# Patient Record
Sex: MALE | Race: WHITE | Employment: OTHER | URBAN - METROPOLITAN AREA
[De-identification: names, ages, dates, MRNs, and addresses within clinical notes are randomized per-mention and may not be internally consistent; named-entity substitution may affect disease eponyms.]

---

## 2018-06-05 ENCOUNTER — NEW PATIENT (OUTPATIENT)
Dept: URBAN - METROPOLITAN AREA CLINIC 26 | Facility: CLINIC | Age: 68
End: 2018-06-05

## 2018-06-05 VITALS
HEART RATE: 46 BPM | SYSTOLIC BLOOD PRESSURE: 133 MMHG | DIASTOLIC BLOOD PRESSURE: 74 MMHG | BODY MASS INDEX: 31.15 KG/M2 | HEIGHT: 61 IN | WEIGHT: 165 LBS

## 2018-06-05 DIAGNOSIS — H02.836: ICD-10-CM

## 2018-06-05 DIAGNOSIS — H02.833: ICD-10-CM

## 2018-06-05 DIAGNOSIS — H43.391: ICD-10-CM

## 2018-06-05 DIAGNOSIS — H35.373: ICD-10-CM

## 2018-06-05 DIAGNOSIS — H43.812: ICD-10-CM

## 2018-06-05 DIAGNOSIS — E11.9: ICD-10-CM

## 2018-06-05 DIAGNOSIS — H04.123: ICD-10-CM

## 2018-06-05 PROCEDURE — 92250 FUNDUS PHOTOGRAPHY W/I&R: CPT

## 2018-06-05 PROCEDURE — 92004 COMPRE OPH EXAM NEW PT 1/>: CPT

## 2018-06-05 ASSESSMENT — TONOMETRY
OS_IOP_MMHG: 13
OD_IOP_MMHG: 15

## 2018-06-05 ASSESSMENT — VISUAL ACUITY
OD_SC: 20/25
OS_SC: 20/20-1

## 2019-07-16 ENCOUNTER — FOLLOW UP (OUTPATIENT)
Dept: URBAN - METROPOLITAN AREA CLINIC 26 | Facility: CLINIC | Age: 69
End: 2019-07-16

## 2019-07-16 VITALS — HEIGHT: 61 IN | BODY MASS INDEX: 30.58 KG/M2 | WEIGHT: 162 LBS

## 2019-07-16 DIAGNOSIS — H43.812: ICD-10-CM

## 2019-07-16 DIAGNOSIS — H02.833: ICD-10-CM

## 2019-07-16 DIAGNOSIS — H43.391: ICD-10-CM

## 2019-07-16 DIAGNOSIS — H04.123: ICD-10-CM

## 2019-07-16 DIAGNOSIS — H35.373: ICD-10-CM

## 2019-07-16 DIAGNOSIS — E11.9: ICD-10-CM

## 2019-07-16 DIAGNOSIS — H02.836: ICD-10-CM

## 2019-07-16 PROCEDURE — 92134 CPTRZ OPH DX IMG PST SGM RTA: CPT

## 2019-07-16 PROCEDURE — 92250 FUNDUS PHOTOGRAPHY W/I&R: CPT

## 2019-07-16 PROCEDURE — 92014 COMPRE OPH EXAM EST PT 1/>: CPT

## 2019-07-16 ASSESSMENT — TONOMETRY
OD_IOP_MMHG: 16
OS_IOP_MMHG: 12

## 2019-07-16 ASSESSMENT — VISUAL ACUITY
OS_SC: 20/30
OD_SC: 20/30-2

## 2020-07-22 ENCOUNTER — FOLLOW UP (OUTPATIENT)
Dept: URBAN - METROPOLITAN AREA CLINIC 26 | Facility: CLINIC | Age: 70
End: 2020-07-22

## 2020-07-22 VITALS — BODY MASS INDEX: 30.58 KG/M2 | WEIGHT: 162 LBS | HEIGHT: 61 IN

## 2020-07-22 DIAGNOSIS — H33.311: ICD-10-CM

## 2020-07-22 DIAGNOSIS — H35.373: ICD-10-CM

## 2020-07-22 DIAGNOSIS — H43.812: ICD-10-CM

## 2020-07-22 DIAGNOSIS — H43.391: ICD-10-CM

## 2020-07-22 DIAGNOSIS — E11.9: ICD-10-CM

## 2020-07-22 PROCEDURE — 92250 FUNDUS PHOTOGRAPHY W/I&R: CPT

## 2020-07-22 PROCEDURE — 92014 COMPRE OPH EXAM EST PT 1/>: CPT

## 2020-07-22 PROCEDURE — 92134 CPTRZ OPH DX IMG PST SGM RTA: CPT

## 2020-07-22 ASSESSMENT — VISUAL ACUITY
OS_SC: 20/20-1
OD_SC: 20/25-2

## 2020-07-22 ASSESSMENT — TONOMETRY
OS_IOP_MMHG: 13
OD_IOP_MMHG: 15

## 2021-08-03 ENCOUNTER — FOLLOW UP (OUTPATIENT)
Dept: URBAN - METROPOLITAN AREA CLINIC 26 | Facility: CLINIC | Age: 71
End: 2021-08-03

## 2021-08-03 DIAGNOSIS — H33.311: ICD-10-CM

## 2021-08-03 DIAGNOSIS — H43.391: ICD-10-CM

## 2021-08-03 DIAGNOSIS — H35.373: ICD-10-CM

## 2021-08-03 DIAGNOSIS — E11.9: ICD-10-CM

## 2021-08-03 DIAGNOSIS — H43.812: ICD-10-CM

## 2021-08-03 PROCEDURE — 92250 FUNDUS PHOTOGRAPHY W/I&R: CPT

## 2021-08-03 PROCEDURE — 92014 COMPRE OPH EXAM EST PT 1/>: CPT

## 2021-08-03 PROCEDURE — 92134 CPTRZ OPH DX IMG PST SGM RTA: CPT

## 2021-08-03 ASSESSMENT — VISUAL ACUITY
OS_SC: 20/30+1
OD_SC: 20/25-2

## 2021-08-03 ASSESSMENT — TONOMETRY
OD_IOP_MMHG: 14
OS_IOP_MMHG: 8

## 2022-07-09 ENCOUNTER — TELEPHONE ENCOUNTER (OUTPATIENT)
Dept: URBAN - METROPOLITAN AREA CLINIC 121 | Facility: CLINIC | Age: 72
End: 2022-07-09

## 2022-07-10 ENCOUNTER — TELEPHONE ENCOUNTER (OUTPATIENT)
Dept: URBAN - METROPOLITAN AREA CLINIC 121 | Facility: CLINIC | Age: 72
End: 2022-07-10

## 2022-07-30 ENCOUNTER — TELEPHONE ENCOUNTER (OUTPATIENT)
Age: 72
End: 2022-07-30

## 2022-07-31 ENCOUNTER — TELEPHONE ENCOUNTER (OUTPATIENT)
Age: 72
End: 2022-07-31

## 2022-08-09 ENCOUNTER — FOLLOW UP (OUTPATIENT)
Dept: URBAN - METROPOLITAN AREA CLINIC 26 | Facility: CLINIC | Age: 72
End: 2022-08-09

## 2022-08-09 VITALS — HEIGHT: 61 IN | BODY MASS INDEX: 30.58 KG/M2 | WEIGHT: 162 LBS

## 2022-08-09 DIAGNOSIS — E11.9: ICD-10-CM

## 2022-08-09 DIAGNOSIS — H43.391: ICD-10-CM

## 2022-08-09 DIAGNOSIS — H43.812: ICD-10-CM

## 2022-08-09 DIAGNOSIS — H04.123: ICD-10-CM

## 2022-08-09 DIAGNOSIS — H35.373: ICD-10-CM

## 2022-08-09 DIAGNOSIS — H33.311: ICD-10-CM

## 2022-08-09 PROCEDURE — 92014 COMPRE OPH EXAM EST PT 1/>: CPT

## 2022-08-09 PROCEDURE — 92134 CPTRZ OPH DX IMG PST SGM RTA: CPT

## 2022-08-09 PROCEDURE — 92250 FUNDUS PHOTOGRAPHY W/I&R: CPT

## 2022-08-09 ASSESSMENT — VISUAL ACUITY
OS_SC: 20/30-2
OD_SC: 20/30-2

## 2022-08-09 ASSESSMENT — TONOMETRY
OD_IOP_MMHG: 12
OS_IOP_MMHG: 13

## 2023-08-09 ENCOUNTER — FOLLOW UP (OUTPATIENT)
Dept: URBAN - METROPOLITAN AREA CLINIC 26 | Facility: CLINIC | Age: 73
End: 2023-08-09

## 2023-08-09 VITALS — BODY MASS INDEX: 30.58 KG/M2 | WEIGHT: 162 LBS | HEIGHT: 61 IN

## 2023-08-09 DIAGNOSIS — E11.9: ICD-10-CM

## 2023-08-09 DIAGNOSIS — H43.391: ICD-10-CM

## 2023-08-09 DIAGNOSIS — H04.123: ICD-10-CM

## 2023-08-09 DIAGNOSIS — H33.311: ICD-10-CM

## 2023-08-09 DIAGNOSIS — H35.373: ICD-10-CM

## 2023-08-09 DIAGNOSIS — H43.812: ICD-10-CM

## 2023-08-09 PROCEDURE — 92014 COMPRE OPH EXAM EST PT 1/>: CPT

## 2023-08-09 PROCEDURE — 92134 CPTRZ OPH DX IMG PST SGM RTA: CPT

## 2023-08-09 PROCEDURE — 92250 FUNDUS PHOTOGRAPHY W/I&R: CPT

## 2023-08-09 RX ORDER — ERYTHROMYCIN 5 MG/G: OINTMENT OPHTHALMIC EVERY EVENING

## 2023-08-09 ASSESSMENT — TONOMETRY
OD_IOP_MMHG: 20
OS_IOP_MMHG: 15

## 2023-08-09 ASSESSMENT — VISUAL ACUITY
OS_SC: 20/25-1
OD_SC: 20/40-1
OD_PH: 20/25-1

## 2023-11-26 PROBLEM — 428283002: Status: ACTIVE | Noted: 2023-11-26

## 2023-11-26 PROBLEM — 197321007: Status: ACTIVE | Noted: 2023-11-26

## 2023-11-27 ENCOUNTER — LAB OUTSIDE AN ENCOUNTER (OUTPATIENT)
Dept: URBAN - METROPOLITAN AREA CLINIC 60 | Facility: CLINIC | Age: 73
End: 2023-11-27

## 2023-11-27 ENCOUNTER — OFFICE VISIT (OUTPATIENT)
Dept: URBAN - METROPOLITAN AREA CLINIC 60 | Facility: CLINIC | Age: 73
End: 2023-11-27
Payer: MEDICARE

## 2023-11-27 VITALS
HEART RATE: 106 BPM | HEIGHT: 60 IN | DIASTOLIC BLOOD PRESSURE: 82 MMHG | BODY MASS INDEX: 30.63 KG/M2 | OXYGEN SATURATION: 93 % | SYSTOLIC BLOOD PRESSURE: 140 MMHG | WEIGHT: 156 LBS | TEMPERATURE: 97.7 F

## 2023-11-27 DIAGNOSIS — K21.9 CHRONIC GERD: ICD-10-CM

## 2023-11-27 DIAGNOSIS — K76.0 FATTY LIVER: ICD-10-CM

## 2023-11-27 DIAGNOSIS — Z86.010 PERSONAL HISTORY OF COLONIC POLYPS: ICD-10-CM

## 2023-11-27 PROCEDURE — 99204 OFFICE O/P NEW MOD 45 MIN: CPT | Performed by: PHYSICIAN ASSISTANT

## 2023-11-27 RX ORDER — SITAGLIPTIN 100 MG/1
TABLET, FILM COATED ORAL
Qty: 30 TABLET | Status: ACTIVE | COMMUNITY

## 2023-11-27 RX ORDER — ICOSAPENT ETHYL 1 G/1
CAPSULE ORAL
Qty: 60 CAPSULE | Status: ACTIVE | COMMUNITY

## 2023-11-27 RX ORDER — METOPROLOL SUCCINATE 25 MG/1
TABLET, EXTENDED RELEASE ORAL
Qty: 45 TABLET | Status: ACTIVE | COMMUNITY

## 2023-11-27 RX ORDER — ALBUTEROL SULFATE 108 UG/1
INHALE 1 TO 2 PUFFS EVERY 4 TO 6 HOURS AS NEEDED FOR WHEEZE FOR UP TO 30 DAYS INHALANT RESPIRATORY (INHALATION)
Qty: 6.7 GRAM | Refills: 0 | Status: ACTIVE | COMMUNITY

## 2023-11-27 RX ORDER — ATORVASTATIN CALCIUM 80 MG/1
TABLET, FILM COATED ORAL
Qty: 90 TABLET | Status: ACTIVE | COMMUNITY

## 2023-11-27 RX ORDER — AMLODIPINE BESYLATE 10 MG/1
TABLET ORAL
Qty: 90 TABLET | Status: ACTIVE | COMMUNITY

## 2023-11-27 RX ORDER — OMEPRAZOLE 40 MG/1
CAPSULE, DELAYED RELEASE ORAL
Qty: 30 CAPSULE | Status: ACTIVE | COMMUNITY

## 2023-11-27 RX ORDER — EMPAGLIFLOZIN 25 MG/1
TABLET, FILM COATED ORAL
Qty: 30 TABLET | Status: ACTIVE | COMMUNITY

## 2023-11-27 RX ORDER — RANOLAZINE 500 MG/1
TABLET, FILM COATED, EXTENDED RELEASE ORAL
Qty: 180 TABLET | Status: ACTIVE | COMMUNITY

## 2023-11-27 RX ORDER — NIRMATRELVIR AND RITONAVIR 300-100 MG
KIT ORAL
Qty: 30 TABLET | Status: ACTIVE | COMMUNITY

## 2023-11-27 RX ORDER — DIAZEPAM 5 MG/1
TABLET ORAL
Qty: 2 TABLET | Status: ACTIVE | COMMUNITY

## 2023-11-27 RX ORDER — TAMSULOSIN HYDROCHLORIDE 0.4 MG/1
CAPSULE ORAL
Qty: 90 CAPSULE | Status: ACTIVE | COMMUNITY

## 2023-11-27 RX ORDER — CLOPIDOGREL BISULFATE 75 MG/1
TABLET, FILM COATED ORAL
Qty: 90 TABLET | Status: ACTIVE | COMMUNITY

## 2023-11-27 RX ORDER — RIVAROXABAN 20 MG/1
TABLET, FILM COATED ORAL
Qty: 30 TABLET | Status: ACTIVE | COMMUNITY

## 2023-11-27 RX ORDER — ERYTHROMYCIN 5 MG/G
OINTMENT OPHTHALMIC
Qty: 3.5 GRAM | Status: ACTIVE | COMMUNITY

## 2023-11-27 RX ORDER — ALPRAZOLAM 0.5 MG/1
TABLET ORAL
Qty: 60 TABLET | Status: ACTIVE | COMMUNITY

## 2023-11-27 RX ORDER — HYDROCODONE BITARTRATE AND ACETAMINOPHEN 10; 325 MG/1; MG/1
TABLET ORAL
Qty: 60 TABLET | Status: ACTIVE | COMMUNITY

## 2023-11-27 NOTE — HPI-TODAY'S VISIT:
Jim is a pleasant 73-year-old male who presents today for evaluation of a surveillance colonoscopy  Labs dated 10/4/23 showed a normal hemoglobin.  Normal platelets.  Normal creatinine.  Normal GFR.  Normal LFTs.  Colonoscopy in June 2014 showed two 5 to 9 mm hyperplastic polyps removed from the sigmoid colon.  Diverticulosis in the sigmoid colon.  Internal hemorrhoids.  Colonoscopy May 2019 with Dr. Yates showed multiple diverticula in the sigmoid colon with no evidence of inflammation or bleeding.  Good prep.  Placed into a 5-year recall  Notes 1-2 daily bowel movements without constipation or diarrhea. Doing well when he remembers to take Metamucil. Chronic reflux and chronic PPI therapy for many years. EGD naive. Denies nausea, vomiting, dysphagia, odynophagia, hematemesis, coffee ground emesis, abdominal pain, change in bowel habits, abnormal weight loss, BRBPR or melena. Denies family history of colon cancer or colon polyps. No other GI complaints at this time

## 2023-12-04 ENCOUNTER — LAB OUTSIDE AN ENCOUNTER (OUTPATIENT)
Dept: URBAN - METROPOLITAN AREA CLINIC 60 | Facility: CLINIC | Age: 73
End: 2023-12-04

## 2023-12-11 ENCOUNTER — TELEPHONE ENCOUNTER (OUTPATIENT)
Dept: URBAN - METROPOLITAN AREA CLINIC 63 | Facility: CLINIC | Age: 73
End: 2023-12-11

## 2023-12-21 ENCOUNTER — TELEPHONE ENCOUNTER (OUTPATIENT)
Dept: URBAN - METROPOLITAN AREA CLINIC 63 | Facility: CLINIC | Age: 73
End: 2023-12-21

## 2023-12-21 ENCOUNTER — LAB OUTSIDE AN ENCOUNTER (OUTPATIENT)
Dept: URBAN - METROPOLITAN AREA CLINIC 63 | Facility: CLINIC | Age: 73
End: 2023-12-21

## 2023-12-21 ENCOUNTER — OFFICE VISIT (OUTPATIENT)
Dept: URBAN - METROPOLITAN AREA SURGERY CENTER 4 | Facility: SURGERY CENTER | Age: 73
End: 2023-12-21

## 2023-12-21 PROBLEM — 19943007: Status: ACTIVE | Noted: 2023-12-21

## 2024-02-05 ENCOUNTER — OV EP (OUTPATIENT)
Dept: URBAN - METROPOLITAN AREA CLINIC 60 | Facility: CLINIC | Age: 74
End: 2024-02-05

## 2024-04-08 ENCOUNTER — LAB (OUTPATIENT)
Dept: URBAN - METROPOLITAN AREA CLINIC 60 | Facility: CLINIC | Age: 74
End: 2024-04-08

## 2024-04-08 ENCOUNTER — OV EP (OUTPATIENT)
Dept: URBAN - METROPOLITAN AREA CLINIC 60 | Facility: CLINIC | Age: 74
End: 2024-04-08
Payer: MEDICARE

## 2024-04-08 VITALS
SYSTOLIC BLOOD PRESSURE: 146 MMHG | OXYGEN SATURATION: 96 % | HEIGHT: 60 IN | WEIGHT: 153.6 LBS | HEART RATE: 115 BPM | TEMPERATURE: 97.4 F | DIASTOLIC BLOOD PRESSURE: 80 MMHG | BODY MASS INDEX: 30.15 KG/M2

## 2024-04-08 DIAGNOSIS — K76.0 FATTY LIVER: ICD-10-CM

## 2024-04-08 DIAGNOSIS — K74.69 OTHER CIRRHOSIS OF LIVER: ICD-10-CM

## 2024-04-08 DIAGNOSIS — Z86.010 PERSONAL HISTORY OF COLONIC POLYPS: ICD-10-CM

## 2024-04-08 DIAGNOSIS — K21.9 CHRONIC GERD: ICD-10-CM

## 2024-04-08 PROCEDURE — 99214 OFFICE O/P EST MOD 30 MIN: CPT | Performed by: PHYSICIAN ASSISTANT

## 2024-04-08 RX ORDER — CLOPIDOGREL BISULFATE 75 MG/1
TABLET, FILM COATED ORAL
Qty: 90 TABLET | Status: ACTIVE | COMMUNITY

## 2024-04-08 RX ORDER — RANOLAZINE 500 MG/1
TABLET, FILM COATED, EXTENDED RELEASE ORAL
Qty: 180 TABLET | Status: ACTIVE | COMMUNITY

## 2024-04-08 RX ORDER — TAMSULOSIN HYDROCHLORIDE 0.4 MG/1
CAPSULE ORAL
Qty: 90 CAPSULE | Status: ACTIVE | COMMUNITY

## 2024-04-08 RX ORDER — RIVAROXABAN 20 MG/1
TABLET, FILM COATED ORAL
Qty: 30 TABLET | Status: ACTIVE | COMMUNITY

## 2024-04-08 RX ORDER — POLYETHYLENE GLYCOL 3350, SODIUM CHLORIDE, SODIUM BICARBONATE, POTASSIUM CHLORIDE 420; 11.2; 5.72; 1.48 G/4L; G/4L; G/4L; G/4L
AS DIRECTED POWDER, FOR SOLUTION ORAL ONCE
Qty: 4000 | Refills: 0 | OUTPATIENT
Start: 2024-04-08 | End: 2024-04-09

## 2024-04-08 RX ORDER — AMLODIPINE BESYLATE 10 MG/1
TABLET ORAL
Qty: 90 TABLET | Status: ACTIVE | COMMUNITY

## 2024-04-08 RX ORDER — DIAZEPAM 5 MG/1
TABLET ORAL
Qty: 2 TABLET | Status: ACTIVE | COMMUNITY

## 2024-04-08 RX ORDER — METOPROLOL SUCCINATE 25 MG/1
TABLET, EXTENDED RELEASE ORAL
Qty: 45 TABLET | Status: ACTIVE | COMMUNITY

## 2024-04-08 RX ORDER — ALBUTEROL SULFATE 108 UG/1
INHALE 1 TO 2 PUFFS EVERY 4 TO 6 HOURS AS NEEDED FOR WHEEZE FOR UP TO 30 DAYS INHALANT RESPIRATORY (INHALATION)
Qty: 6.7 GRAM | Refills: 0 | Status: ACTIVE | COMMUNITY

## 2024-04-08 RX ORDER — ONDANSETRON HYDROCHLORIDE 4 MG/1
1 TABLET TABLET, FILM COATED ORAL
Qty: 2 | Refills: 0 | OUTPATIENT
Start: 2024-04-08

## 2024-04-08 RX ORDER — ATORVASTATIN CALCIUM 80 MG/1
TABLET, FILM COATED ORAL
Qty: 90 TABLET | Status: ACTIVE | COMMUNITY

## 2024-04-08 RX ORDER — HYDROCODONE BITARTRATE AND ACETAMINOPHEN 10; 325 MG/1; MG/1
TABLET ORAL
Qty: 60 TABLET | Status: ACTIVE | COMMUNITY

## 2024-04-08 RX ORDER — ERYTHROMYCIN 5 MG/G
OINTMENT OPHTHALMIC
Qty: 3.5 GRAM | Status: ACTIVE | COMMUNITY

## 2024-04-08 RX ORDER — OMEPRAZOLE 40 MG/1
CAPSULE, DELAYED RELEASE ORAL
Qty: 30 CAPSULE | Status: ACTIVE | COMMUNITY

## 2024-04-08 RX ORDER — NIRMATRELVIR AND RITONAVIR 300-100 MG
KIT ORAL
Qty: 30 TABLET | Status: ACTIVE | COMMUNITY

## 2024-04-08 RX ORDER — ALPRAZOLAM 0.5 MG/1
TABLET ORAL
Qty: 60 TABLET | Status: ACTIVE | COMMUNITY

## 2024-04-08 RX ORDER — SITAGLIPTIN 100 MG/1
TABLET, FILM COATED ORAL
Qty: 30 TABLET | Status: ACTIVE | COMMUNITY

## 2024-04-08 RX ORDER — ICOSAPENT ETHYL 1 G/1
CAPSULE ORAL
Qty: 60 CAPSULE | Status: ACTIVE | COMMUNITY

## 2024-04-08 RX ORDER — EMPAGLIFLOZIN 25 MG/1
TABLET, FILM COATED ORAL
Qty: 30 TABLET | Status: ACTIVE | COMMUNITY

## 2024-04-08 NOTE — HPI-TODAY'S VISIT:
Jim is a pleasant 73-year-old male who presents today for a follow up  Labs dated 11/28/2023 showed a normal hemoglobin.  Normal platelets.  Creatinine 0.56.  .  ALT 64 but otherwise normal LFTs.  TSH normal.  CRP normal.  Vitamin D normal.  Ultrasound dated 12/19/2023 showed cirrhotic liver morphology.  No sonographic evidence of HCC  FibroScan dated 12/19/2023 showed S3 and F4  Labs dated 10/4/23 showed a normal hemoglobin.  Normal platelets.  Normal creatinine.  Normal GFR.  Normal LFTs.  Colonoscopy in June 2014 showed two 5 to 9 mm hyperplastic polyps removed from the sigmoid colon.  Diverticulosis in the sigmoid colon.  Internal hemorrhoids.  Colonoscopy May 2019 with Dr. Yates showed multiple diverticula in the sigmoid colon with no evidence of inflammation or bleeding.  Good prep.  Placed into a 5-year recall  Denies history of heavy EtOH use. Notes 1-2 daily bowel movements without constipation or diarrhea. Doing well with Metamucil. Chronic reflux and chronic PPI therapy for many years. EGD naive. EGD ordered last visit was canceled as patient was ill. Denies nausea, vomiting, dysphagia, odynophagia, hematemesis, coffee ground emesis, abdominal pain, change in bowel habits, abnormal weight loss, BRBPR or melena. Denies family history of colon cancer or colon polyps. No other GI complaints at this time

## 2024-04-15 ENCOUNTER — LAB (OUTPATIENT)
Dept: URBAN - METROPOLITAN AREA CLINIC 60 | Facility: CLINIC | Age: 74
End: 2024-04-15

## 2024-05-02 ENCOUNTER — LAB OUTSIDE AN ENCOUNTER (OUTPATIENT)
Dept: URBAN - METROPOLITAN AREA CLINIC 63 | Facility: CLINIC | Age: 74
End: 2024-05-02

## 2024-05-14 ENCOUNTER — LAB OUTSIDE AN ENCOUNTER (OUTPATIENT)
Dept: URBAN - METROPOLITAN AREA CLINIC 63 | Facility: CLINIC | Age: 74
End: 2024-05-14

## 2024-05-17 ENCOUNTER — TELEPHONE ENCOUNTER (OUTPATIENT)
Dept: URBAN - METROPOLITAN AREA CLINIC 60 | Facility: CLINIC | Age: 74
End: 2024-05-17

## 2024-06-07 ENCOUNTER — TELEPHONE ENCOUNTER (OUTPATIENT)
Dept: URBAN - METROPOLITAN AREA CLINIC 63 | Facility: CLINIC | Age: 74
End: 2024-06-07

## 2024-06-07 PROBLEM — 14783006: Status: ACTIVE | Noted: 2024-06-07

## 2024-06-10 ENCOUNTER — OFFICE VISIT (OUTPATIENT)
Dept: URBAN - METROPOLITAN AREA MEDICAL CENTER 3 | Facility: MEDICAL CENTER | Age: 74
End: 2024-06-10

## 2024-06-10 RX ORDER — DIAZEPAM 5 MG/1
TABLET ORAL
Qty: 2 TABLET | Status: ACTIVE | COMMUNITY

## 2024-06-10 RX ORDER — SITAGLIPTIN 100 MG/1
TABLET, FILM COATED ORAL
Qty: 30 TABLET | Status: ACTIVE | COMMUNITY

## 2024-06-10 RX ORDER — ONDANSETRON HYDROCHLORIDE 4 MG/1
1 TABLET TABLET, FILM COATED ORAL
Qty: 2 | Refills: 0 | Status: ACTIVE | COMMUNITY
Start: 2024-04-08

## 2024-06-10 RX ORDER — ICOSAPENT ETHYL 1 G/1
CAPSULE ORAL
Qty: 60 CAPSULE | Status: ACTIVE | COMMUNITY

## 2024-06-10 RX ORDER — RIVAROXABAN 20 MG/1
TABLET, FILM COATED ORAL
Qty: 30 TABLET | Status: ACTIVE | COMMUNITY

## 2024-06-10 RX ORDER — TAMSULOSIN HYDROCHLORIDE 0.4 MG/1
CAPSULE ORAL
Qty: 90 CAPSULE | Status: ACTIVE | COMMUNITY

## 2024-06-10 RX ORDER — ALPRAZOLAM 0.5 MG/1
TABLET ORAL
Qty: 60 TABLET | Status: ACTIVE | COMMUNITY

## 2024-06-10 RX ORDER — METOPROLOL SUCCINATE 25 MG/1
TABLET, EXTENDED RELEASE ORAL
Qty: 45 TABLET | Status: ACTIVE | COMMUNITY

## 2024-06-10 RX ORDER — EMPAGLIFLOZIN 25 MG/1
TABLET, FILM COATED ORAL
Qty: 30 TABLET | Status: ACTIVE | COMMUNITY

## 2024-06-10 RX ORDER — NIRMATRELVIR AND RITONAVIR 300-100 MG
KIT ORAL
Qty: 30 TABLET | Status: ACTIVE | COMMUNITY

## 2024-06-10 RX ORDER — OMEPRAZOLE 40 MG/1
CAPSULE, DELAYED RELEASE ORAL
Qty: 30 CAPSULE | Status: ACTIVE | COMMUNITY

## 2024-06-10 RX ORDER — ALBUTEROL SULFATE 108 UG/1
INHALE 1 TO 2 PUFFS EVERY 4 TO 6 HOURS AS NEEDED FOR WHEEZE FOR UP TO 30 DAYS INHALANT RESPIRATORY (INHALATION)
Qty: 6.7 GRAM | Refills: 0 | Status: ACTIVE | COMMUNITY

## 2024-06-10 RX ORDER — CLOPIDOGREL BISULFATE 75 MG/1
TABLET, FILM COATED ORAL
Qty: 90 TABLET | Status: ACTIVE | COMMUNITY

## 2024-06-10 RX ORDER — HYDROCODONE BITARTRATE AND ACETAMINOPHEN 10; 325 MG/1; MG/1
TABLET ORAL
Qty: 60 TABLET | Status: ACTIVE | COMMUNITY

## 2024-06-10 RX ORDER — AMLODIPINE BESYLATE 10 MG/1
TABLET ORAL
Qty: 90 TABLET | Status: ACTIVE | COMMUNITY

## 2024-06-10 RX ORDER — RANOLAZINE 500 MG/1
TABLET, FILM COATED, EXTENDED RELEASE ORAL
Qty: 180 TABLET | Status: ACTIVE | COMMUNITY

## 2024-06-10 RX ORDER — ERYTHROMYCIN 5 MG/G
OINTMENT OPHTHALMIC
Qty: 3.5 GRAM | Status: ACTIVE | COMMUNITY

## 2024-06-10 RX ORDER — ATORVASTATIN CALCIUM 80 MG/1
TABLET, FILM COATED ORAL
Qty: 90 TABLET | Status: ACTIVE | COMMUNITY

## 2024-06-24 ENCOUNTER — DASHBOARD ENCOUNTERS (OUTPATIENT)
Age: 74
End: 2024-06-24

## 2024-06-24 ENCOUNTER — OFFICE VISIT (OUTPATIENT)
Dept: URBAN - METROPOLITAN AREA CLINIC 60 | Facility: CLINIC | Age: 74
End: 2024-06-24

## 2024-06-24 ENCOUNTER — LAB OUTSIDE AN ENCOUNTER (OUTPATIENT)
Dept: URBAN - METROPOLITAN AREA CLINIC 60 | Facility: CLINIC | Age: 74
End: 2024-06-24

## 2024-06-24 VITALS
SYSTOLIC BLOOD PRESSURE: 138 MMHG | DIASTOLIC BLOOD PRESSURE: 86 MMHG | OXYGEN SATURATION: 93 % | HEART RATE: 110 BPM | HEIGHT: 60 IN | TEMPERATURE: 97.4 F

## 2024-06-24 PROBLEM — 420054005: Status: ACTIVE | Noted: 2024-06-24

## 2024-06-24 RX ORDER — CLOPIDOGREL BISULFATE 75 MG/1
TABLET, FILM COATED ORAL
Qty: 90 TABLET | Status: ACTIVE | COMMUNITY

## 2024-06-24 RX ORDER — ALBUTEROL SULFATE 108 UG/1
INHALE 1 TO 2 PUFFS EVERY 4 TO 6 HOURS AS NEEDED FOR WHEEZE FOR UP TO 30 DAYS INHALANT RESPIRATORY (INHALATION)
Qty: 6.7 GRAM | Refills: 0 | Status: ACTIVE | COMMUNITY

## 2024-06-24 RX ORDER — ERYTHROMYCIN 5 MG/G
OINTMENT OPHTHALMIC
Qty: 3.5 GRAM | Status: ACTIVE | COMMUNITY

## 2024-06-24 RX ORDER — RIVAROXABAN 20 MG/1
TABLET, FILM COATED ORAL
Qty: 30 TABLET | Status: ACTIVE | COMMUNITY

## 2024-06-24 RX ORDER — METOPROLOL SUCCINATE 25 MG/1
TABLET, EXTENDED RELEASE ORAL
Qty: 45 TABLET | Status: ACTIVE | COMMUNITY

## 2024-06-24 RX ORDER — ATORVASTATIN CALCIUM 80 MG/1
TABLET, FILM COATED ORAL
Qty: 90 TABLET | Status: ACTIVE | COMMUNITY

## 2024-06-24 RX ORDER — TAMSULOSIN HYDROCHLORIDE 0.4 MG/1
CAPSULE ORAL
Qty: 90 CAPSULE | Status: ACTIVE | COMMUNITY

## 2024-06-24 RX ORDER — HYDROCODONE BITARTRATE AND ACETAMINOPHEN 10; 325 MG/1; MG/1
TABLET ORAL
Qty: 60 TABLET | Status: ACTIVE | COMMUNITY

## 2024-06-24 RX ORDER — ICOSAPENT ETHYL 1 G/1
CAPSULE ORAL
Qty: 60 CAPSULE | Status: ACTIVE | COMMUNITY

## 2024-06-24 RX ORDER — NIRMATRELVIR AND RITONAVIR 300-100 MG
KIT ORAL
Qty: 30 TABLET | Status: ACTIVE | COMMUNITY

## 2024-06-24 RX ORDER — RIFAXIMIN 550 MG/1
1 TABLET TABLET ORAL TWICE A DAY
Qty: 180 TABLET | Refills: 3 | OUTPATIENT
Start: 2024-06-24 | End: 2025-06-19

## 2024-06-24 RX ORDER — RANOLAZINE 500 MG/1
TABLET, FILM COATED, EXTENDED RELEASE ORAL
Qty: 180 TABLET | Status: ACTIVE | COMMUNITY

## 2024-06-24 RX ORDER — DIAZEPAM 5 MG/1
TABLET ORAL
Qty: 2 TABLET | Status: ACTIVE | COMMUNITY

## 2024-06-24 RX ORDER — AMLODIPINE BESYLATE 10 MG/1
TABLET ORAL
Qty: 90 TABLET | Status: ACTIVE | COMMUNITY

## 2024-06-24 RX ORDER — ALPRAZOLAM 0.5 MG/1
TABLET ORAL
Qty: 60 TABLET | Status: ACTIVE | COMMUNITY

## 2024-06-24 RX ORDER — OMEPRAZOLE 40 MG/1
CAPSULE, DELAYED RELEASE ORAL
Qty: 30 CAPSULE | Status: ACTIVE | COMMUNITY

## 2024-06-24 RX ORDER — EMPAGLIFLOZIN 25 MG/1
TABLET, FILM COATED ORAL
Qty: 30 TABLET | Status: ACTIVE | COMMUNITY

## 2024-06-24 RX ORDER — ONDANSETRON HYDROCHLORIDE 4 MG/1
1 TABLET TABLET, FILM COATED ORAL
Qty: 2 | Refills: 0 | Status: ACTIVE | COMMUNITY
Start: 2024-04-08

## 2024-06-24 RX ORDER — SITAGLIPTIN 100 MG/1
TABLET, FILM COATED ORAL
Qty: 30 TABLET | Status: ACTIVE | COMMUNITY

## 2024-06-24 NOTE — HPI-ZZZTODAY'S VISIT
Jim is a very pleasant 74-year-old male who presents for follow-up.  He is previously a patient of Dr. Sarah.  Last seen by Andrez Krishna PA-C 4/8/2024.  Past medical history significant for GI bleed, ARTURO, DM, BPH, CAD, GERD, hypercholesterolemia, cirrhosis, hypertension, atrial flutter, fatty liver.  Past surgical history significant for orthopedic procedure, cardiac stent, appendectomy, cataract procedure, inguinal hernia repair, CABG, carotid endarterectomy with bovine patch.  Last colonoscopy and endoscopy 6/10/2024.  Family history noncontributory.  Marcelo presents for follow up. He is grossly asymptomatic from a GI persepective. He does admit that he is having inreased confusion such as losing important items and having trouble with conversation. He denies dysphagia, dyspepsia, pyrosis, abdominal pain, change in bowel habits, unintentional weight loss, melena, or hematochezia.  Patient denies jaundice, pruritus, change in mentation, abdominal distention, recent weight gain, or sleep disturbances.

## 2024-06-24 NOTE — HPI-PREVIOUS IMAGING
5/14/2024 MR abdomen liver with and without contrast consistent with hepatic steatosis and cirrhotic liver morphology with evidence of early mild portal hypertension and no ascites.  No evidence of HCC, small pancreatic sidebranch IPMN largest measuring 0.7 cm. 5/2/2024 duplex ultrasound of portal veins consistent with morphology changes of cirrhosis without evidence of portal hypertension or thrombosis.

## 2024-06-24 NOTE — HPI-PREVIOUS PROCEDURES
Previous procedures: 6/10/2024 no esophageal varices present in distal esophagus Z-line normal Minimal gastritis in antrum and body No gastric varices present Advancement into the duodenal bulb first and second portion duodenum normal GLORIA within normal limits Ileocecal valve within normal limits Preparation was adequate 5 mm sessile polyp in mid transverse colon 2 diminutive polyps in mid sigmoid colon Mild internal hemorrhoids Pathology consistent with: Gastric antrum biopsy consistent with gastric mucosa with reactive gastropathy and dilated fundic glands negative for H. pylori Distal esophageal biopsy consistent with squamous mucosa with features of reflux esophagitis Mid transverse colon biopsy consistent with tubular adenoma Mid sigmoid polyp consistent with tubular adenoma with fragments of benign colonic mucosa with superficial hyperplastic changes

## 2024-06-24 NOTE — HPI-PREVIOUS LABS
5/31/2024 CBC significant for MPV 8.5, PT 25.9, INR 2.47, AFP within normal limits at 4.11, CMP significant for glucose 121, creatinine 0.55, anion gap of 5, total bilirubin 1.7 (sodium 130, INR 2.47, bilirubin 1.7, creatinine 0.55; MELD 23), hep B surface antibody negative, hep B core antibody negative, hep B surface antigen negative hep a negative, hep C-

## 2024-07-05 ENCOUNTER — LAB OUTSIDE AN ENCOUNTER (OUTPATIENT)
Dept: URBAN - METROPOLITAN AREA CLINIC 63 | Facility: CLINIC | Age: 74
End: 2024-07-05

## 2024-07-09 ENCOUNTER — TELEPHONE ENCOUNTER (OUTPATIENT)
Dept: URBAN - METROPOLITAN AREA CLINIC 60 | Facility: CLINIC | Age: 74
End: 2024-07-09

## 2024-08-13 ENCOUNTER — COMPREHENSIVE EXAM (OUTPATIENT)
Dept: URBAN - METROPOLITAN AREA CLINIC 26 | Facility: CLINIC | Age: 74
End: 2024-08-13

## 2024-08-13 VITALS — BODY MASS INDEX: 28.51 KG/M2 | HEIGHT: 61 IN | WEIGHT: 151 LBS

## 2024-08-13 DIAGNOSIS — H43.812: ICD-10-CM

## 2024-08-13 DIAGNOSIS — H01.026: ICD-10-CM

## 2024-08-13 DIAGNOSIS — H01.023: ICD-10-CM

## 2024-08-13 DIAGNOSIS — H43.391: ICD-10-CM

## 2024-08-13 DIAGNOSIS — H02.836: ICD-10-CM

## 2024-08-13 DIAGNOSIS — H04.123: ICD-10-CM

## 2024-08-13 DIAGNOSIS — H35.373: ICD-10-CM

## 2024-08-13 DIAGNOSIS — H33.311: ICD-10-CM

## 2024-08-13 DIAGNOSIS — H26.493: ICD-10-CM

## 2024-08-13 DIAGNOSIS — E11.9: ICD-10-CM

## 2024-08-13 DIAGNOSIS — H02.833: ICD-10-CM

## 2024-08-13 PROCEDURE — 92134 CPTRZ OPH DX IMG PST SGM RTA: CPT

## 2024-08-13 PROCEDURE — 92014 COMPRE OPH EXAM EST PT 1/>: CPT

## 2024-08-13 PROCEDURE — 92250 FUNDUS PHOTOGRAPHY W/I&R: CPT | Mod: 59

## 2024-08-13 ASSESSMENT — TONOMETRY
OS_IOP_MMHG: 11
OD_IOP_MMHG: 10

## 2024-08-13 ASSESSMENT — VISUAL ACUITY
OS_SC: 20/30
OD_PH: 20/20-2
OD_SC: 20/40-2

## 2024-12-24 ENCOUNTER — LAB OUTSIDE AN ENCOUNTER (OUTPATIENT)
Dept: URBAN - METROPOLITAN AREA CLINIC 60 | Facility: CLINIC | Age: 74
End: 2024-12-24

## 2025-08-13 ENCOUNTER — COMPREHENSIVE EXAM (OUTPATIENT)
Age: 75
End: 2025-08-13

## 2025-08-13 VITALS — HEIGHT: 60 IN | BODY MASS INDEX: 28.27 KG/M2 | WEIGHT: 144 LBS

## 2025-08-13 DIAGNOSIS — E11.9: ICD-10-CM

## 2025-08-13 DIAGNOSIS — H43.391: ICD-10-CM

## 2025-08-13 DIAGNOSIS — H01.023: ICD-10-CM

## 2025-08-13 DIAGNOSIS — H26.493: ICD-10-CM

## 2025-08-13 DIAGNOSIS — H02.833: ICD-10-CM

## 2025-08-13 DIAGNOSIS — H33.311: ICD-10-CM

## 2025-08-13 DIAGNOSIS — H02.836: ICD-10-CM

## 2025-08-13 DIAGNOSIS — H43.812: ICD-10-CM

## 2025-08-13 DIAGNOSIS — H01.026: ICD-10-CM

## 2025-08-13 DIAGNOSIS — H35.373: ICD-10-CM

## 2025-08-13 DIAGNOSIS — H04.123: ICD-10-CM

## 2025-08-13 PROCEDURE — 92014 COMPRE OPH EXAM EST PT 1/>: CPT

## 2025-08-13 PROCEDURE — 92134 CPTRZ OPH DX IMG PST SGM RTA: CPT

## 2025-08-13 PROCEDURE — 92250 FUNDUS PHOTOGRAPHY W/I&R: CPT | Mod: 59
